# Patient Record
Sex: FEMALE | Race: WHITE | Employment: FULL TIME | URBAN - METROPOLITAN AREA
[De-identification: names, ages, dates, MRNs, and addresses within clinical notes are randomized per-mention and may not be internally consistent; named-entity substitution may affect disease eponyms.]

---

## 2017-01-31 ENCOUNTER — HOSPITAL ENCOUNTER (OUTPATIENT)
Dept: MRI IMAGING | Age: 47
Discharge: HOME | End: 2017-01-31
Attending: OBSTETRICS & GYNECOLOGY | Admitting: OBSTETRICS & GYNECOLOGY

## 2017-01-31 DIAGNOSIS — D25.1 INTRAMURAL LEIOMYOMA OF UTERUS: ICD-10-CM

## 2017-01-31 DIAGNOSIS — N83.202 BILATERAL OVARIAN CYSTS: ICD-10-CM

## 2017-01-31 DIAGNOSIS — N83.201 BILATERAL OVARIAN CYSTS: ICD-10-CM

## 2017-02-28 ENCOUNTER — HOSPITAL ENCOUNTER (OUTPATIENT)
Dept: MRI IMAGING | Age: 47
Discharge: HOME | End: 2017-02-28
Attending: OBSTETRICS & GYNECOLOGY | Admitting: OBSTETRICS & GYNECOLOGY

## 2017-02-28 DIAGNOSIS — D25.1 INTRAMURAL LEIOMYOMA OF UTERUS: ICD-10-CM

## 2022-03-14 ENCOUNTER — HOSPITAL ENCOUNTER (OUTPATIENT)
Age: 52
Setting detail: SPECIMEN
Discharge: HOME | End: 2022-03-14
Payer: COMMERCIAL

## 2022-03-14 ENCOUNTER — OFFICE VISIT (OUTPATIENT)
Dept: OBGYN | Age: 52
End: 2022-03-14
Payer: COMMERCIAL

## 2022-03-14 VITALS
BODY MASS INDEX: 30.55 KG/M2 | DIASTOLIC BLOOD PRESSURE: 97 MMHG | WEIGHT: 166 LBS | SYSTOLIC BLOOD PRESSURE: 155 MMHG | HEIGHT: 62 IN | HEART RATE: 94 BPM

## 2022-03-14 DIAGNOSIS — Z12.31 SCREENING MAMMOGRAM FOR BREAST CANCER: ICD-10-CM

## 2022-03-14 DIAGNOSIS — N74 FEMALE PELVIC INFLAMMATORY DISORDERS IN DISEASES CLASSIFIED ELSEWHERE: ICD-10-CM

## 2022-03-14 DIAGNOSIS — Z00.00 ROUTINE MEDICAL EXAM: ICD-10-CM

## 2022-03-14 DIAGNOSIS — N92.1 MENOMETRORRHAGIA: ICD-10-CM

## 2022-03-14 DIAGNOSIS — R03.0 ELEVATED BLOOD PRESSURE READING: ICD-10-CM

## 2022-03-14 DIAGNOSIS — Z01.419 ENCOUNTER FOR ANNUAL ROUTINE GYNECOLOGICAL EXAMINATION: Primary | ICD-10-CM

## 2022-03-14 LAB
A/G RATIO: 1.6 (ref 1.1–2.2)
ALBUMIN SERPL-MCNC: 4.6 G/DL (ref 3.4–5)
ALP BLD-CCNC: 80 U/L (ref 40–129)
ALT SERPL-CCNC: 14 U/L (ref 10–40)
ANION GAP SERPL CALCULATED.3IONS-SCNC: 17 MMOL/L (ref 3–16)
AST SERPL-CCNC: 16 U/L (ref 15–37)
BILIRUB SERPL-MCNC: 0.3 MG/DL (ref 0–1)
BUN BLDV-MCNC: 15 MG/DL (ref 7–20)
CALCIUM SERPL-MCNC: 9.8 MG/DL (ref 8.3–10.6)
CHLORIDE BLD-SCNC: 102 MMOL/L (ref 99–110)
CHOLESTEROL, TOTAL: 203 MG/DL (ref 0–199)
CO2: 22 MMOL/L (ref 21–32)
CREAT SERPL-MCNC: 0.7 MG/DL (ref 0.6–1.1)
FOLLICLE STIMULATING HORMONE: 40.4 MIU/ML
GFR AFRICAN AMERICAN: >60
GFR NON-AFRICAN AMERICAN: >60
GLUCOSE BLD-MCNC: 97 MG/DL (ref 70–99)
HCT VFR BLD CALC: 38.2 % (ref 36–48)
HDLC SERPL-MCNC: 52 MG/DL (ref 40–60)
HEMOGLOBIN: 12 G/DL (ref 12–16)
LDL CHOLESTEROL CALCULATED: 135 MG/DL
MCH RBC QN AUTO: 22.9 PG (ref 26–34)
MCHC RBC AUTO-ENTMCNC: 31.4 G/DL (ref 31–36)
MCV RBC AUTO: 73 FL (ref 80–100)
PDW BLD-RTO: 19.4 % (ref 12.4–15.4)
PLATELET # BLD: 281 K/UL (ref 135–450)
PMV BLD AUTO: 8.8 FL (ref 5–10.5)
POTASSIUM SERPL-SCNC: 4.3 MMOL/L (ref 3.5–5.1)
RBC # BLD: 5.23 M/UL (ref 4–5.2)
SODIUM BLD-SCNC: 141 MMOL/L (ref 136–145)
TOTAL PROTEIN: 7.5 G/DL (ref 6.4–8.2)
TRIGL SERPL-MCNC: 82 MG/DL (ref 0–150)
TSH REFLEX FT4: 1.67 UIU/ML (ref 0.27–4.2)
VLDLC SERPL CALC-MCNC: 16 MG/DL
WBC # BLD: 7.6 K/UL (ref 4–11)

## 2022-03-14 PROCEDURE — 88142 CYTOPATH C/V THIN LAYER: CPT

## 2022-03-14 PROCEDURE — 87624 HPV HI-RISK TYP POOLED RSLT: CPT

## 2022-03-14 PROCEDURE — 36415 COLL VENOUS BLD VENIPUNCTURE: CPT | Performed by: OBSTETRICS & GYNECOLOGY

## 2022-03-14 PROCEDURE — 99396 PREV VISIT EST AGE 40-64: CPT | Performed by: OBSTETRICS & GYNECOLOGY

## 2022-03-14 SDOH — ECONOMIC STABILITY: HOUSING INSECURITY
IN THE LAST 12 MONTHS, WAS THERE A TIME WHEN YOU DID NOT HAVE A STEADY PLACE TO SLEEP OR SLEPT IN A SHELTER (INCLUDING NOW)?: NO

## 2022-03-14 SDOH — ECONOMIC STABILITY: INCOME INSECURITY: HOW HARD IS IT FOR YOU TO PAY FOR THE VERY BASICS LIKE FOOD, HOUSING, MEDICAL CARE, AND HEATING?: NOT VERY HARD

## 2022-03-14 SDOH — ECONOMIC STABILITY: FOOD INSECURITY: WITHIN THE PAST 12 MONTHS, THE FOOD YOU BOUGHT JUST DIDN'T LAST AND YOU DIDN'T HAVE MONEY TO GET MORE.: NEVER TRUE

## 2022-03-14 SDOH — ECONOMIC STABILITY: INCOME INSECURITY: IN THE LAST 12 MONTHS, WAS THERE A TIME WHEN YOU WERE NOT ABLE TO PAY THE MORTGAGE OR RENT ON TIME?: NO

## 2022-03-14 SDOH — ECONOMIC STABILITY: FOOD INSECURITY: WITHIN THE PAST 12 MONTHS, YOU WORRIED THAT YOUR FOOD WOULD RUN OUT BEFORE YOU GOT MONEY TO BUY MORE.: NEVER TRUE

## 2022-03-14 SDOH — ECONOMIC STABILITY: TRANSPORTATION INSECURITY
IN THE PAST 12 MONTHS, HAS LACK OF TRANSPORTATION KEPT YOU FROM MEETINGS, WORK, OR FROM GETTING THINGS NEEDED FOR DAILY LIVING?: NO

## 2022-03-14 SDOH — ECONOMIC STABILITY: TRANSPORTATION INSECURITY
IN THE PAST 12 MONTHS, HAS THE LACK OF TRANSPORTATION KEPT YOU FROM MEDICAL APPOINTMENTS OR FROM GETTING MEDICATIONS?: NO

## 2022-03-14 ASSESSMENT — ENCOUNTER SYMPTOMS
ALLERGIC/IMMUNOLOGIC NEGATIVE: 1
GASTROINTESTINAL NEGATIVE: 1
EYES NEGATIVE: 1
RESPIRATORY NEGATIVE: 1

## 2022-03-14 ASSESSMENT — PATIENT HEALTH QUESTIONNAIRE - PHQ9
SUM OF ALL RESPONSES TO PHQ QUESTIONS 1-9: 0
SUM OF ALL RESPONSES TO PHQ QUESTIONS 1-9: 0
1. LITTLE INTEREST OR PLEASURE IN DOING THINGS: 0
SUM OF ALL RESPONSES TO PHQ QUESTIONS 1-9: 0
SUM OF ALL RESPONSES TO PHQ QUESTIONS 1-9: 0
SUM OF ALL RESPONSES TO PHQ9 QUESTIONS 1 & 2: 0
2. FEELING DOWN, DEPRESSED OR HOPELESS: 0

## 2022-03-14 NOTE — PROGRESS NOTES
3/14/22    [facility]  1970    Chief Complaint   Patient presents with    Gynecologic Exam     pt here for annual,is sexually active, irregular menses, LMP-3/5/22, last pap-8/17/2019-normal, mammo-2/25/21-normal.     Lower Back Pain     pt c/o lower back back pain x 6-8 months, dull pain, does not radiate.  Irregular Menses     pt c/o irregular menses x 2 months, menses lastin 2 1/2 wks, brightred, light bleeding. The patient is a 47 y.o. female, No obstetric history on file. who presents for her annual exam.  She is still menstruating. Her LMP was @LMP@. She is not currently taking birth control. She is  sexually active. She reports additional symptoms of abnormal bleeding and low back pain. Pap smear history: Her last PAP smear was in 2019. Her results were normal.    Breast history: her most recent mammogram was in 2019. The results were: Normal    Osteoporosis Status: she has not had a bone density scan    Colonoscopy Status: She has not had a colonoscopy in the past.    Past Medical History:   Diagnosis Date    Abnormal Pap smear of cervix     Fibroids     Hyperlipidemia     Hypertension     Irregular menses     Lower back pain     Ovarian cyst        Past Surgical History:   Procedure Laterality Date    TONSILLECTOMY      WISDOM TOOTH EXTRACTION         Family History   Problem Relation Age of Onset    Parkinson's Disease Maternal Grandmother     Prostate Cancer Paternal Grandmother        Social History     Tobacco Use    Smoking status: Never Smoker    Smokeless tobacco: Never Used   Vaping Use    Vaping Use: Never used   Substance Use Topics    Alcohol use: Yes     Comment: little    Drug use: Never       No current outpatient medications on file. No current facility-administered medications for this visit. No Known Allergies    No obstetric history on file. There is no immunization history on file for this patient.     Review of Systems Constitutional: Negative. Eyes: Negative. Respiratory: Negative. Cardiovascular: Negative. Gastrointestinal: Negative. Endocrine: Negative. Genitourinary: Positive for menstrual problem. Musculoskeletal: Negative. Skin: Negative. Allergic/Immunologic: Negative. Neurological: Negative. Hematological: Negative. Psychiatric/Behavioral: Negative. Repeat 155/97  BP (!) 155/97   Pulse 94   Ht 5' 2\" (1.575 m)   Wt 166 lb (75.3 kg)   LMP 03/05/2022   BMI 30.36 kg/m²     Physical Exam  Exam conducted with a chaperone present. Constitutional:       Appearance: Normal appearance. HENT:      Head: Normocephalic and atraumatic. Nose: Nose normal.   Eyes:      Conjunctiva/sclera: Conjunctivae normal.   Cardiovascular:      Rate and Rhythm: Normal rate. Pulses: Normal pulses. Pulmonary:      Effort: Pulmonary effort is normal.   Chest:   Breasts:      Right: Normal. No axillary adenopathy or supraclavicular adenopathy. Left: Normal. No axillary adenopathy or supraclavicular adenopathy. Abdominal:      General: Abdomen is flat. Bowel sounds are normal.      Palpations: Abdomen is soft. There is mass. Hernia: There is no hernia in the left inguinal area or right inguinal area. Comments: Uterus 26 week size with larger fibroid extending to luq   Genitourinary:     General: Normal vulva. Exam position: Lithotomy position. Labia:         Right: No rash, tenderness or lesion. Left: No rash, tenderness or lesion. Urethra: No prolapse. Vagina: Normal. No foreign body. No vaginal discharge, erythema, tenderness, bleeding, lesions or prolapsed vaginal walls. Cervix: No cervical motion tenderness, discharge, friability, lesion, erythema or cervical bleeding. Uterus: Normal. Enlarged. Not tender and no uterine prolapse.        Adnexa: Right adnexa normal and left adnexa normal.        Right: No mass, tenderness or fullness. Left: No mass, tenderness or fullness. Musculoskeletal:      Cervical back: Normal range of motion and neck supple. Lymphadenopathy:      Upper Body:      Right upper body: No supraclavicular, axillary or pectoral adenopathy. Left upper body: No supraclavicular, axillary or pectoral adenopathy. Skin:     General: Skin is warm. Coloration: Skin is not ashen or cyanotic. Findings: No abrasion, abscess or bruising. Rash is not crusting or macular. Neurological:      Mental Status: She is alert and oriented to person, place, and time. No results found for this visit on 03/14/22. Assessment and Plan   Diagnosis Orders   1. Encounter for annual routine gynecological examination  PAP SMEAR   2. Female pelvic inflammatory disorders in diseases classified elsewhere  PAP SMEAR   3. Screening mammogram for breast cancer  MARISSA DIGITAL SCREEN W OR WO CAD BILATERAL   4. Menometrorrhagia  CBC    Follicle Stimulating Hormone    TSH with Reflex to FT4    US NON OB TRANSVAGINAL   5. Routine medical exam  Kenn Acharya MD, Gastroenterology, [facility]   6. Elevated blood pressure reading  Lipid Panel    Comprehensive Metabolic Panel   follow up after us    Take BP at home, keep log, bring BP to next visit    Return in about 1 year (around 3/14/2023).     Martin Esparza MD

## 2022-03-15 ENCOUNTER — TELEPHONE (OUTPATIENT)
Dept: GASTROENTEROLOGY | Age: 52
End: 2022-03-15

## 2022-03-18 LAB
HPV HIGH RISK: NOT DETECTED
HPV, GENOTYPE 16: NOT DETECTED
HPV, GENOTYPE 18: NOT DETECTED

## 2022-03-22 ENCOUNTER — TELEPHONE (OUTPATIENT)
Dept: GASTROENTEROLOGY | Age: 52
End: 2022-03-22

## 2022-03-29 ENCOUNTER — TELEPHONE (OUTPATIENT)
Dept: GASTROENTEROLOGY | Age: 52
End: 2022-03-29

## 2022-03-29 ENCOUNTER — OFFICE VISIT (OUTPATIENT)
Dept: OBGYN | Age: 52
End: 2022-03-29
Payer: COMMERCIAL

## 2022-03-29 VITALS
DIASTOLIC BLOOD PRESSURE: 91 MMHG | SYSTOLIC BLOOD PRESSURE: 161 MMHG | WEIGHT: 166 LBS | HEIGHT: 62 IN | BODY MASS INDEX: 30.55 KG/M2

## 2022-03-29 DIAGNOSIS — Z12.31 SCREENING MAMMOGRAM FOR BREAST CANCER: ICD-10-CM

## 2022-03-29 DIAGNOSIS — D25.1 FIBROIDS, INTRAMURAL: ICD-10-CM

## 2022-03-29 DIAGNOSIS — N92.1 MENOMETRORRHAGIA: Primary | ICD-10-CM

## 2022-03-29 PROCEDURE — 99213 OFFICE O/P EST LOW 20 MIN: CPT | Performed by: OBSTETRICS & GYNECOLOGY

## 2022-03-29 NOTE — PROGRESS NOTES
Musculoskeletal:         General: Normal range of motion. Cervical back: Normal range of motion and neck supple. No rigidity. Skin:     General: Skin is warm and dry. Neurological:      General: No focal deficit present. Mental Status: She is alert and oriented to person, place, and time.    Psychiatric:         Mood and Affect: Mood normal.         Behavior: Behavior normal.         No results found for this visit on 03/29/22.  03/14/2022 1540 03/18/2022 0842 Human papillomavirus (HPV) DNA probe cervical brush high risk [[id number]]   Cervix    Component Value   HPV High Risk NOT DETECTED   HPV, Genotype 16 NOT DETECTED   HPV, Genotype 18 NOT DETECTED            03/14/2022 1539 03/14/2022 2055 Comprehensive Metabolic Panel [[id number]]   (Abnormal)   Blood    Component Value Units   Sodium 141 mmol/L   Potassium 4.3 mmol/L   Chloride 102 mmol/L   CO2 22 mmol/L   Anion Gap 17 High     Glucose 97 mg/dL   BUN 15 mg/dL   CREATININE 0.7 mg/dL   GFR Non-African American >60     GFR African American >60     Calcium 9.8 mg/dL   Total Protein 7.5 g/dL   Albumin 4.6 g/dL   Albumin/Globulin Ratio 1.6    Total Bilirubin 0.3 mg/dL   Alkaline Phosphatase 80 U/L   ALT 14 U/L   AST 16 U/L           03/14/2022 1539 03/14/2022 2055 Lipid Panel [[id number]]   (Abnormal)   Blood    Component Value Units   Cholesterol, Total 203 High  mg/dL   Triglycerides 82 mg/dL   HDL 52 mg/dL   LDL Calculated 135 High  mg/dL   VLDL Cholesterol Calculated 16 mg/dL           03/14/2022 1539 03/14/2022 2050 TSH with Reflex to FT4 [[id number]]   Blood    Component Value Units   TSH Reflex FT4 1.67 uIU/mL           03/14/2022 1539 24/53/1330 7079 Follicle Stimulating Hormone [[id number]]   Blood    Component Value Units   FSH 40.4  mIU/mL           03/14/2022 1539 03/14/2022 2033 CBC [[id number]]   (Abnormal)   Blood    Component Value Units   WBC 7.6 K/uL   RBC 5.23 High  M/uL   Hemoglobin 12.0 g/dL   Hematocrit 38.2 %   MCV 73.0 Low  fL MCH 22.9 Low  pg   MCHC 31.4 g/dL   RDW 19.4 High  %   Platelets 656 K/uL   MPV 8.8 fL             See us report (unable to visualize ems)    ASSESSMENT AND PLAN   Diagnosis Orders   1. Menometrorrhagia     2. Fibroids, intramural       Recommend endometrial sampling (hysterosocpy and D&C,possibly EMB) to rule out hyperplasia and neoplasia. She declines but states she will consider. Return if symptoms worsen or fail to improve.     Yancy Mcgee MD

## 2022-03-29 NOTE — TELEPHONE ENCOUNTER
Called pt. For the 3rd time in regards to a referral for a colon screening. Phone was picked up and then immediately disconnected. No contact made.

## 2022-07-21 ENCOUNTER — HOSPITAL ENCOUNTER (EMERGENCY)
Age: 52
Discharge: HOME | End: 2022-07-22
Attending: EMERGENCY MEDICINE
Payer: COMMERCIAL

## 2022-07-21 ENCOUNTER — APPOINTMENT (OUTPATIENT)
Dept: GENERAL RADIOLOGY | Age: 52
End: 2022-07-21
Payer: COMMERCIAL

## 2022-07-21 DIAGNOSIS — S53.105A CLOSED DISLOCATION OF LEFT ELBOW, INITIAL ENCOUNTER: Primary | ICD-10-CM

## 2022-07-21 PROCEDURE — 73080 X-RAY EXAM OF ELBOW: CPT

## 2022-07-21 PROCEDURE — 24605 TX CLSD ELBOW DISLC REQ ANES: CPT

## 2022-07-21 PROCEDURE — 99285 EMERGENCY DEPT VISIT HI MDM: CPT

## 2022-07-21 PROCEDURE — 24600 TX CLSD ELBOW DISLC W/O ANES: CPT

## 2022-07-21 RX ORDER — ETOMIDATE 2 MG/ML
7 INJECTION INTRAVENOUS ONCE
Status: COMPLETED | OUTPATIENT
Start: 2022-07-21 | End: 2022-07-22

## 2022-07-21 ASSESSMENT — PAIN SCALES - GENERAL: PAINLEVEL_OUTOF10: 6

## 2022-07-21 ASSESSMENT — PAIN - FUNCTIONAL ASSESSMENT: PAIN_FUNCTIONAL_ASSESSMENT: NONE - DENIES PAIN

## 2022-07-21 ASSESSMENT — PAIN DESCRIPTION - FREQUENCY: FREQUENCY: CONTINUOUS

## 2022-07-21 ASSESSMENT — PAIN DESCRIPTION - PAIN TYPE: TYPE: ACUTE PAIN

## 2022-07-21 ASSESSMENT — PAIN DESCRIPTION - LOCATION: LOCATION: ELBOW

## 2022-07-21 ASSESSMENT — PAIN DESCRIPTION - ORIENTATION: ORIENTATION: LEFT

## 2022-07-22 ENCOUNTER — APPOINTMENT (OUTPATIENT)
Dept: GENERAL RADIOLOGY | Age: 52
End: 2022-07-22
Payer: COMMERCIAL

## 2022-07-22 VITALS
OXYGEN SATURATION: 100 % | SYSTOLIC BLOOD PRESSURE: 144 MMHG | HEIGHT: 63 IN | DIASTOLIC BLOOD PRESSURE: 89 MMHG | WEIGHT: 150 LBS | TEMPERATURE: 98.2 F | BODY MASS INDEX: 26.58 KG/M2 | HEART RATE: 79 BPM | RESPIRATION RATE: 16 BRPM

## 2022-07-22 PROCEDURE — 2500000003 HC RX 250 WO HCPCS: Performed by: EMERGENCY MEDICINE

## 2022-07-22 PROCEDURE — 73080 X-RAY EXAM OF ELBOW: CPT

## 2022-07-22 RX ADMIN — ETOMIDATE 7 MG: 2 INJECTION INTRAVENOUS at 00:06

## 2022-07-22 ASSESSMENT — PAIN - FUNCTIONAL ASSESSMENT: PAIN_FUNCTIONAL_ASSESSMENT: 0-10

## 2022-07-22 ASSESSMENT — PAIN DESCRIPTION - LOCATION
LOCATION: ELBOW
LOCATION: ELBOW

## 2022-07-22 ASSESSMENT — PAIN SCALES - GENERAL
PAINLEVEL_OUTOF10: 4
PAINLEVEL_OUTOF10: 7

## 2022-07-22 ASSESSMENT — PAIN DESCRIPTION - ORIENTATION
ORIENTATION: LEFT
ORIENTATION: LEFT

## 2022-07-22 NOTE — ED PROVIDER NOTES
Transportation (Medical): No    Lack of Transportation (Non-Medical): No   Physical Activity: Not on file   Stress: Not on file   Social Connections: Not on file   Intimate Partner Violence: Not on file   Housing Stability: Unknown    Unable to Pay for Housing in the Last Year: No    Number of Places Lived in the Last Year: Not on file    Unstable Housing in the Last Year: No     No current facility-administered medications for this encounter. No current outpatient medications on file. No Known Allergies    Nursing Notes Reviewed    Physical Exam:  ED Triage Vitals [07/21/22 2123]   Enc Vitals Group      BP (!) 153/102      Heart Rate 85      Resp 15      Temp 98.2 °F (36.8 °C)      Temp Source Oral      SpO2 96 %      Weight 150 lb (68 kg)      Height 5' 3\" (1.6 m)      Head Circumference       Peak Flow       Pain Score       Pain Loc       Pain Edu? Excl. in [address]? GENERAL APPEARANCE: Awake and alert. Cooperative. No acute distress. HEAD: Normocephalic. Atraumatic. EYES: EOM's grossly intact. Sclera anicteric. ENT: Mucous membranes are moist. Tolerates saliva. No trismus. NECK: No meningismus. HEART:  Extremities pink  LUNGS: Respirations unlabored. Even chest rise bilaterally  ABDOMEN: Non distended. EXTREMITIES: LUE: Elbow deformity with limited range of motion and swelling. 2+ radial pulse. Sensory distribution of radial/median/ulnar nerves intact. 5/5 strength on wrist flexion and extension. SKIN: Dry  NEUROLOGICAL: No gross facial drooping. Moves all 4 extremities spontaneously. PSYCHIATRIC: Normal mood. I have reviewed and interpreted all of the currently available lab results from this visit (if applicable):  No results found for this visit on 07/21/22.    Radiographs (if obtained):  [] The following radiograph was interpreted by myself in the absence of a radiologist:  [x] Radiologist's Report Reviewed:    EKG (if obtained): (All EKG's are interpreted by myself in the absence of a cardiologist)    MDM:  Plan of care is discussed thoroughly with the patient and family if present. If performed, all imaging and lab work also discussed with patient. All relevant prior results and chart reviewed if available. Patient presents as above. She is neurovascular intact. Presents with isolated left elbow injury and does have elbow dislocation. Patient was reduced under sedation without complication. Repeat films do not show any evidence of acute fracture. Neurovascular status unchanged on reevaluation. She is placed in a sling and will follow up with orthopedic surgery. Patient agrees with this plan of care. Procedure Note - Joint Reduction: The benefits, risks, and alternatives of elbow reduction were discussed with the patient. Questions were sought and answered. Written consent was given for the procedure. Prior to joint reduction a time out with nursing was called. Jesus Amato was given 7mg of etomidate via IV and procedural pain control was adequately achieved. The dislocation and/or fracture was reduced to the best of my abilities utilizing traction. Following reduction, immobilization was performed and the extremity's neurovascular status was re-checked and was unchanged from the pre-procedure exam. The patient tolerated the procedure without complications. Instructions were given to return immediately for increasing pain, new numbness or weakness, a cold/pale extremity or any other worsening or worrisome concerns. Procedure Note - Procedural Sedation: The benefits, risks, and alternatives of procedural sedation were discussed with the patient. Questions were sought and answered. Written consent was obtained for the procedure. Oxygen was administered and the appropriate pre-procedural policies were followed. Cardiac, oxygenation and blood pressure monitoring occurred.     Airway Assessment: dentition not prohibitive, normal neck range of motion, Mallampati Class II - (soft palate, fauces & uvula are visible)    ASA Classification: Class 2 - A normal healthy patient with mild systemic disease    Prior to sedation a time out with nursing was called. Edward Brush was given a total of 7mg of etomidate and adequate procedural sedation was achieved. The patient tolerated the procedure without complications. Edward Brush regained consciousness as expected and has recovered to their baseline mental status. 20 minutes of intra-service time was provided. Clinical Impression:  1.  Closed dislocation of left elbow, initial encounter      (Please note that portions of this note may have been completed with a voice recognition program. Efforts were made to edit the dictations but occasionally words are mis-transcribed.)    MD Miladis Biggs MD  07/22/22 3181

## 2022-07-22 NOTE — ED NOTES
Discharge instructions reviewed with patient. The patient voiced understanding of the discharge paperwork and received no prescriptions. The patient left alert and oriented with no questions or concerns.       Rea Vivar RN  07/22/22 9921

## 2022-07-22 NOTE — ED NOTES
The patient presents to the emergency department alert and oriented with a complaint of left elbow pain after a fall in the bathroom. The patient denies any other injuries. The patient placed on the monitor with vital signs taken. Assessment as follows; Skin is pink, warm and dry. He left elbow is deformed.        Jacinto Schuster RN  07/21/22 5410

## 2022-07-22 NOTE — ED NOTES
Etomidate 13 mg was wasted by placing in medication disposal container after speaking with the pharmacist who states that there was no waist paper due to the way the medication was ordered. This waist was witnessed by [employer].      Carli Trent RN  07/22/22 9874

## 2022-07-29 ENCOUNTER — OFFICE VISIT (OUTPATIENT)
Dept: ORTHOPEDIC SURGERY | Age: 52
End: 2022-07-29
Payer: COMMERCIAL

## 2022-07-29 VITALS
WEIGHT: 168 LBS | HEART RATE: 86 BPM | DIASTOLIC BLOOD PRESSURE: 84 MMHG | HEIGHT: 63 IN | BODY MASS INDEX: 29.77 KG/M2 | SYSTOLIC BLOOD PRESSURE: 148 MMHG | OXYGEN SATURATION: 97 %

## 2022-07-29 DIAGNOSIS — M25.322 INSTABILITY OF LEFT ELBOW JOINT: Primary | ICD-10-CM

## 2022-07-29 PROCEDURE — 99203 OFFICE O/P NEW LOW 30 MIN: CPT | Performed by: STUDENT IN AN ORGANIZED HEALTH CARE EDUCATION/TRAINING PROGRAM

## 2022-07-29 ASSESSMENT — ENCOUNTER SYMPTOMS
VOMITING: 0
WHEEZING: 0
BACK PAIN: 0
COLOR CHANGE: 0
ABDOMINAL PAIN: 0
FACIAL SWELLING: 0
NAUSEA: 0
EYE REDNESS: 0
COUGH: 0
SHORTNESS OF BREATH: 0
EYE PAIN: 0
STRIDOR: 0
PHOTOPHOBIA: 0

## 2022-07-29 NOTE — PROGRESS NOTES
7/29/2022   Chief Complaint   Patient presents with    Elbow Injury     Reporting pain on elbow/triceps after having a fall at home. History of Present Illness:                             Les Munoz is a 47 y.o. female right hand-dominant male referred by emergency room for evaluation and treatment of left elbow dislocation. Patient was seen in the [facility] emergency room approximate 1 week ago when she slipped and fell from standing height in her home landing on her outstretched left arm. She suffered a elbow dislocation at that time and had immediate deformity and pain and was unable to move the elbow due to mechanical block. She was brought to the emergency room where x-rays demonstrated a simple left elbow dislocation. She was subsequently sedated and the elbow was reduced without complication. Patient had no complications from the elbow dislocation or reduction procedure. She was placed in a simple sling and told to follow-up with orthopedics. She is here today for first visit with myself. She states she is coming to the sling as her elbow has regained full range of motion. She states she has essentially no pain at this time and no additional symptoms such as numbness or tingling. She states she can fully extend and flex the elbow without issue. She denies any prior elbow injury including dislocation. No additional interventions in regards to the left elbow. Related history: negative for prior surgery, additional trauma, arthritis or disorders. Is affecting ADLs. Pain is 1/10 at it's worst.    Outside reports reviewed: Emergency room note and imaging reviewed    Patient's medications, allergies, past medical, surgical, social and family histories were reviewed and updated as appropriate.      Patient has not previously attempted CSI, PT, NSAIDs for pain relief     MA HPI: Patient, 62 yo female, presents to the office today for a followup appointment following an ED visit on 7/21/2022. Two sets of x-rays were taken. Patient reports pain on point of elbow and around triceps. ROM improving. Right hand dominant. No report of numbness or tingling. Medical History  Patient's medications, allergies, past medical, surgical, social and family histories were reviewed and updated as appropriate. Past Medical History:   Diagnosis Date    Abnormal Pap smear of cervix     Fibroids     Hyperlipidemia     Hypertension     Irregular menses     Lower back pain     Ovarian cyst      Past Surgical History:   Procedure Laterality Date    TONSILLECTOMY      WISDOM TOOTH EXTRACTION       Family History   Problem Relation Age of Onset    Parkinson's Disease Maternal Grandmother     Prostate Cancer Paternal Grandmother      Social History     Socioeconomic History    Marital status:    Tobacco Use    Smoking status: Never    Smokeless tobacco: Never   Vaping Use    Vaping Use: Never used   Substance and Sexual Activity    Alcohol use: Yes     Comment: little    Drug use: Never     Social Determinants of Health     Financial Resource Strain: Low Risk     Difficulty of Paying Living Expenses: Not very hard   Food Insecurity: No Food Insecurity    Worried About Running Out of Food in the Last Year: Never true    Ran Out of Food in the Last Year: Never true   Transportation Needs: No Transportation Needs    Lack of Transportation (Medical): No    Lack of Transportation (Non-Medical): No   Housing Stability: Unknown    Unable to Pay for Housing in the Last Year: No    Unstable Housing in the Last Year: No     No current outpatient medications on file. No current facility-administered medications for this visit. No Known Allergies      Review of Systems   Constitutional:  Positive for activity change. Negative for appetite change, chills, diaphoresis, fatigue, fever and unexpected weight change. HENT:  Negative for congestion, ear pain, facial swelling, hearing loss and sneezing.     Eyes: Negative for photophobia, pain and redness. Respiratory:  Negative for cough, shortness of breath, wheezing and stridor. Cardiovascular:  Negative for chest pain, palpitations and leg swelling. Gastrointestinal:  Negative for abdominal pain, nausea and vomiting. Endocrine: Negative for cold intolerance and heat intolerance. Musculoskeletal:  Positive for joint swelling. Negative for arthralgias, back pain, gait problem, myalgias, neck pain and neck stiffness. Skin:  Negative for color change, pallor, rash and wound. Neurological:  Negative for dizziness, facial asymmetry, weakness and numbness. Examination:  General Exam:  Vitals: BP (!) 148/84 (Site: Right Upper Arm, Position: Sitting)   Pulse 86   Ht 5' 3\" (1.6 m)   Wt 168 lb (76.2 kg)   SpO2 97%   BMI 29.76 kg/m²    Physical Exam  Constitutional:       General: She is not in acute distress. Appearance: Normal appearance. She is normal weight. She is not ill-appearing. HENT:      Head: Normocephalic and atraumatic. Eyes:      General:         Right eye: No discharge. Left eye: No discharge. Extraocular Movements: Extraocular movements intact. Cardiovascular:      Rate and Rhythm: Normal rate. Pulses: Normal pulses. Pulmonary:      Effort: Pulmonary effort is normal.      Breath sounds: Normal breath sounds. Musculoskeletal:         General: Swelling and signs of injury present. No tenderness or deformity. Right shoulder: Normal.      Left shoulder: Normal.      Right upper arm: Normal.      Left upper arm: Normal.      Right elbow: Normal.      Left elbow: Swelling present. No deformity, effusion or lacerations. Decreased range of motion. Right forearm: Normal.      Left forearm: Normal.      Right wrist: Normal.      Left wrist: Normal.      Cervical back: Normal range of motion. Skin:     General: Skin is warm and dry.       Capillary Refill: Capillary refill takes less than 2 seconds. Neurological:      General: No focal deficit present. Mental Status: She is alert and oriented to person, place, and time. Mental status is at baseline. Psychiatric:         Mood and Affect: Mood normal.         Behavior: Behavior normal.         LEFT UPPER EXTREMITY EXAM:      OBSERVATION / INSPECTION:     Swelling: Mild at elbow joint    Deformity: none    Discoloration: none     Scars: none     Atrophy: none          TENDERNESS / CREPITUS (T / C): Ulnar Styloid -/-    Radial Styloid -/-    Palm -/-    Metacarpals -/-    Thumb CMC -/-   Thumb MCP -/-    Lesser MCPs -/-    PIP -/-    DIP -/-    Radial Head - / -    Olecranon -/ -    Medial Epicondyle - /-   Lat Epicondyle - / -    Extensor Mass - / -   Flexor Mass - / -    Biceps tendon insertion - / -        Range of motion: ('*' = with pain)     Right Elbow     AROM (PROM)     Extension 0 deg  (5 deg)     Flexion 145 deg (145 deg)        Pronation 90 deg  (90 deg)     Supination 80 deg  (80 deg)                Left Elbow     AROM (PROM)     Extension 5 deg  (0 deg)     Flexion 145 deg (145 deg)        Pronation 90 deg  (90 deg)     Supination 80 deg  (80 deg)          Right Wrist    Extension 80 deg (85 deg)     Flexion 80 deg (85 deg)        Ulnar Deviation  35 deg (40 deg)    Radial Deviation 35 deg (40 deg)             Left Wrist     AROM (PROM)     Extension 80 deg (85 deg)     Flexion 80 deg (85 deg)        Ulnar Deviation  35 deg (40 deg)    Radial Deviation 35 deg (40 deg)         ELBOW EXAMINATION:    Minimal pain with supination pronation    Stable with varus/valgus stress    Moving valgus stress test: Negative    Milking maneuver: Negative    No sensation of instability, subluxation, dislocation or mechanical symptoms with elbow flexion extension including to full extension.            STRENGTH: ('*' = with pain)     Elbow flexion extension not tested this time due to concern for instability of elbow    Wrist Flexion: 5/5    Wrist Extension: 5/5    : 5/5    Intrinsics: 5/5    EPL (Extensor pollicis longus): 5/5    Pinch Mechanism: 5/5      EXTREMITY NEURO-VASCULAR EXAMINATION: Sensation grossly intact to light touch all dermatomal regions. DTR 2+ Biceps, Triceps, BR and Negative Lindy's sign. Grossly intact motor function at Elbow, Wrist and Hand. Distal pulses radial and ulnar 2+, brisk cap refill, symmetric. Diagnostic testing:  X-ray images were reviewed by myself and discussed with the patient:  3 view prereduction of left elbow demonstrate: There is a left elbow dislocation. The distal humerus is dislocated   anteriorly in relation to the olecranon fossa and radial head. No evidence   of an acute fracture. There are a few small smooth ossific densities. Postreduction study recommended. 3 view postreduction of left elbow demonstrate:   Reduction of the previously dislocated left elbow. The alignment is now   acceptable. Bulging fat pads are present from joint effusion/heme arthrosis. No acute fracture is seen around the margins of the left elbow. There are   corticated ossifications at the medial and lateral aspects. Mild swelling around the elbow. Office Procedures:  No orders of the defined types were placed in this encounter. Assessment and Plan    A: Left elbow instability    P:   I had a thorough conversation with the patient regarding her left elbow imaging and treatment course. Because the patient has no history of elbow instability I am hoping this is a isolated event. I explained that she is doing excellent on physical exam, much better than most people do she essentially demonstrates full range of motion with minimal symptoms. However, I do not want to be overly confident in the stability of her elbow at this time we will place her in a hinged elbow brace blocking her from extension beyond 15 degrees as well as flexion beyond 90 degrees.   We will continue this for 4 weeks and then wean her out of the brace and begin her in physical therapy. She can use over-the-counter anti-inflammatories and ice for pain control. She should essentially be in the brace at all times unless doing hygiene. All questions were answered and patient voices understanding.     Electronically signed by Jose Yeung DO on 7/29/2022 at 8:21 AM

## 2022-07-29 NOTE — PATIENT INSTRUCTIONS
Continue weight-bearing as tolerated. Continue range of motion exercises as instructed. Ice and elevate as needed. Tylenol or Motrin for pain.    Wear brace as instructed  Follow up in 4 weeks

## 2022-07-29 NOTE — PROGRESS NOTES
Patient, 62 yo female, presents to the office today for a followup appointment following an ED visit on 7/21/2022. Two sets of x-rays were taken. Patient reports pain on point of elbow and around triceps. ROM improving. Right hand dominant. No report of numbness or tingling.

## 2022-08-26 ENCOUNTER — OFFICE VISIT (OUTPATIENT)
Dept: ORTHOPEDIC SURGERY | Age: 52
End: 2022-08-26
Payer: COMMERCIAL

## 2022-08-26 VITALS
WEIGHT: 168 LBS | OXYGEN SATURATION: 97 % | HEART RATE: 77 BPM | SYSTOLIC BLOOD PRESSURE: 140 MMHG | BODY MASS INDEX: 29.77 KG/M2 | DIASTOLIC BLOOD PRESSURE: 82 MMHG | RESPIRATION RATE: 16 BRPM | HEIGHT: 63 IN

## 2022-08-26 DIAGNOSIS — M25.322 INSTABILITY OF LEFT ELBOW JOINT: Primary | ICD-10-CM

## 2022-08-26 PROCEDURE — 99213 OFFICE O/P EST LOW 20 MIN: CPT | Performed by: STUDENT IN AN ORGANIZED HEALTH CARE EDUCATION/TRAINING PROGRAM

## 2022-08-26 ASSESSMENT — ENCOUNTER SYMPTOMS
PHOTOPHOBIA: 0
STRIDOR: 0
ABDOMINAL PAIN: 0
FACIAL SWELLING: 0
COLOR CHANGE: 0
VOMITING: 0
WHEEZING: 0
BACK PAIN: 0
NAUSEA: 0
EYE REDNESS: 0
EYE PAIN: 0
SHORTNESS OF BREATH: 0
COUGH: 0

## 2022-08-26 NOTE — PROGRESS NOTES
8/26/2022   Chief Complaint   Patient presents with    Follow-up     Left elbow instability     Updated HPI: Patient is here for reevaluation of her left elbow dislocation. She states has been doing well in the brace and has had no issues with sensation of subluxation or dislocation. She states that the elbow feels stable especially in the brace and her pain has been well controlled. She has been following her restrictions while at work and at home without issue. No new injuries or any new orthopedic complaints. Previous HPI (7/29/2022):                             Felicity Covington is a 47 y.o. female right hand-dominant male referred by emergency room for evaluation and treatment of left elbow dislocation. Patient was seen in the [facility] emergency room approximate 1 week ago when she slipped and fell from standing height in her home landing on her outstretched left arm. She suffered a elbow dislocation at that time and had immediate deformity and pain and was unable to move the elbow due to mechanical block. She was brought to the emergency room where x-rays demonstrated a simple left elbow dislocation. She was subsequently sedated and the elbow was reduced without complication. Patient had no complications from the elbow dislocation or reduction procedure. She was placed in a simple sling and told to follow-up with orthopedics. She is here today for first visit with myself. She states she is coming to the sling as her elbow has regained full range of motion. She states she has essentially no pain at this time and no additional symptoms such as numbness or tingling. She states she can fully extend and flex the elbow without issue. She denies any prior elbow injury including dislocation. No additional interventions in regards to the left elbow. Related history: negative for prior surgery, additional trauma, arthritis or disorders. Is affecting ADLs.   Pain is 1/10 at it's worst.    Outside reports reviewed: Emergency room note and imaging reviewed    Patient's medications, allergies, past medical, surgical, social and family histories were reviewed and updated as appropriate. Patient has not previously attempted CSI, PT, NSAIDs for pain relief     MA HPI: Patient, 64 yo female, presents to the office today for a followup appointment following an ED visit on 7/21/2022. Two sets of x-rays were taken. Patient reports pain on point of elbow and around triceps. ROM improving. Right hand dominant. No report of numbness or tingling. Medical History  Patient's medications, allergies, past medical, surgical, social and family histories were reviewed and updated as appropriate.     Past Medical History:   Diagnosis Date    Abnormal Pap smear of cervix     Fibroids     Hyperlipidemia     Hypertension     Irregular menses     Lower back pain     Ovarian cyst      Past Surgical History:   Procedure Laterality Date    TONSILLECTOMY      WISDOM TOOTH EXTRACTION       Family History   Problem Relation Age of Onset    Parkinson's Disease Maternal Grandmother     Prostate Cancer Paternal Grandmother      Social History     Socioeconomic History    Marital status:    Tobacco Use    Smoking status: Never    Smokeless tobacco: Never   Vaping Use    Vaping Use: Never used   Substance and Sexual Activity    Alcohol use: Yes     Comment: little    Drug use: Never     Social Determinants of Health     Financial Resource Strain: Low Risk     Difficulty of Paying Living Expenses: Not very hard   Food Insecurity: No Food Insecurity    Worried About Running Out of Food in the Last Year: Never true    Ran Out of Food in the Last Year: Never true   Transportation Needs: No Transportation Needs    Lack of Transportation (Medical): No    Lack of Transportation (Non-Medical): No   Housing Stability: Unknown    Unable to Pay for Housing in the Last Year: No    Unstable Housing in the Last Year: No     No current outpatient medications on file. No current facility-administered medications for this visit. No Known Allergies      Review of Systems   Constitutional:  Positive for activity change. Negative for appetite change, chills, diaphoresis, fatigue, fever and unexpected weight change. HENT:  Negative for congestion, ear pain, facial swelling, hearing loss and sneezing. Eyes:  Negative for photophobia, pain and redness. Respiratory:  Negative for cough, shortness of breath, wheezing and stridor. Cardiovascular:  Negative for chest pain, palpitations and leg swelling. Gastrointestinal:  Negative for abdominal pain, nausea and vomiting. Endocrine: Negative for cold intolerance and heat intolerance. Musculoskeletal:  Positive for joint swelling. Negative for arthralgias, back pain, gait problem, myalgias, neck pain and neck stiffness. Skin:  Negative for color change, pallor, rash and wound. Neurological:  Negative for dizziness, facial asymmetry, weakness and numbness. Examination:  General Exam:  Vitals: BP (!) 140/82 (Site: Right Upper Arm, Position: Sitting)   Pulse 77   Resp 16   Ht 5' 3\" (1.6 m)   Wt 168 lb (76.2 kg)   SpO2 97%   BMI 29.76 kg/m²    Physical Exam  Constitutional:       General: She is not in acute distress. Appearance: Normal appearance. She is normal weight. She is not ill-appearing. HENT:      Head: Normocephalic and atraumatic. Eyes:      General:         Right eye: No discharge. Left eye: No discharge. Extraocular Movements: Extraocular movements intact. Cardiovascular:      Rate and Rhythm: Normal rate. Pulses: Normal pulses. Pulmonary:      Effort: Pulmonary effort is normal.      Breath sounds: Normal breath sounds. Musculoskeletal:         General: Swelling and signs of injury present. No tenderness or deformity.       Right shoulder: Normal.      Left shoulder: Normal. Right upper arm: Normal.      Left upper arm: Normal.      Right elbow: Normal.      Left elbow: Swelling present. No deformity, effusion or lacerations. Decreased range of motion. Right forearm: Normal.      Left forearm: Normal.      Right wrist: Normal.      Left wrist: Normal.      Cervical back: Normal range of motion. Skin:     General: Skin is warm and dry. Capillary Refill: Capillary refill takes less than 2 seconds. Neurological:      General: No focal deficit present. Mental Status: She is alert and oriented to person, place, and time. Mental status is at baseline. Psychiatric:         Mood and Affect: Mood normal.         Behavior: Behavior normal.         LEFT UPPER EXTREMITY EXAM:      OBSERVATION / INSPECTION:     Swelling: Mild at elbow joint    Deformity: none    Discoloration: none     Scars: none     Atrophy: none          TENDERNESS / CREPITUS (T / C):       Ulnar Styloid -/-    Radial Styloid -/-    Palm -/-    Metacarpals -/-    Thumb CMC -/-   Thumb MCP -/-    Lesser MCPs -/-    PIP -/-    DIP -/-    Radial Head - / -    Olecranon -/ -    Medial Epicondyle - /-   Lat Epicondyle - / -    Extensor Mass - / -   Flexor Mass - / -    Biceps tendon insertion - / -        Range of motion: ('*' = with pain)     Right Elbow     AROM (PROM)     Extension 0 deg  (5 deg)     Flexion 145 deg (145 deg)        Pronation 90 deg  (90 deg)     Supination 80 deg  (80 deg)                Left Elbow     AROM (PROM)     Extension 5 deg  (0 deg)     Flexion 145 deg (145 deg)        Pronation 90 deg  (90 deg)     Supination 80 deg  (80 deg)          Right Wrist    Extension 80 deg (85 deg)     Flexion 80 deg (85 deg)        Ulnar Deviation  35 deg (40 deg)    Radial Deviation 35 deg (40 deg)             Left Wrist     AROM (PROM)     Extension 80 deg (85 deg)     Flexion 80 deg (85 deg)        Ulnar Deviation  35 deg (40 deg)    Radial Deviation 35 deg (40 deg)         ELBOW EXAMINATION:    Minimal pain with supination pronation    Stable with varus/valgus stress    Moving valgus stress test: Negative    Milking maneuver: Negative    No sensation of instability, subluxation, dislocation or mechanical symptoms with elbow flexion extension including to full extension. STRENGTH: ('*' = with pain)     Elbow flexion extension not tested this time due to concern for instability of elbow    Wrist Flexion: 5/5    Wrist Extension: 5/5    : 5/5    Intrinsics: 5/5    EPL (Extensor pollicis longus): 5/5    Pinch Mechanism: 5/5      EXTREMITY NEURO-VASCULAR EXAMINATION: Sensation grossly intact to light touch all dermatomal regions. DTR 2+ Biceps, Triceps, BR and Negative Lindy's sign. Grossly intact motor function at Elbow, Wrist and Hand. Distal pulses radial and ulnar 2+, brisk cap refill, symmetric. Diagnostic testing:  X-ray images were reviewed by myself and discussed with the patient:  3 view prereduction of left elbow demonstrate: There is a left elbow dislocation. The distal humerus is dislocated   anteriorly in relation to the olecranon fossa and radial head. No evidence   of an acute fracture. There are a few small smooth ossific densities. Postreduction study recommended. 3 view postreduction of left elbow demonstrate:   Reduction of the previously dislocated left elbow. The alignment is now   acceptable. Bulging fat pads are present from joint effusion/heme arthrosis. No acute fracture is seen around the margins of the left elbow. There are   corticated ossifications at the medial and lateral aspects. Mild swelling around the elbow. Office Procedures:  No orders of the defined types were placed in this encounter. Assessment and Plan    A: Left elbow instability    P:   I had a thorough conversation the patient regarding her previous imaging, her current physical exam findings, and her treatment course.   At this time we will continue conservative measures. She will be in the brace at all times unless doing hygiene or restful activities at home or with physical therapy. She was given a referral to physical therapy today she will follow-up in 4 weeks for reevaluation and we will likely wean her out of the splint at that time if she continues to do well. She is had no subluxation or dislocation episodes since last being seen her pain is well controlled and therefore I feel that she is doing well conservative treatment we will continue her current restrictions. Continue ice and anti-inflammatories for pain control. All questions answered patient voiced understanding.     Electronically signed by Tanisha Salmeron DO on 8/26/2022 at 8:37 AM

## 2022-08-26 NOTE — PATIENT INSTRUCTIONS
Physical therapy ordered. May come out of the brace at home and for exercises and hygiene. Follow up in 4 weeks.

## 2022-09-22 ENCOUNTER — HOSPITAL ENCOUNTER (OUTPATIENT)
Dept: PHYSICAL THERAPY | Age: 52
Setting detail: THERAPIES SERIES
Discharge: HOME | End: 2022-09-22
Payer: COMMERCIAL

## 2022-09-22 PROCEDURE — 97110 THERAPEUTIC EXERCISES: CPT

## 2022-09-22 PROCEDURE — 97161 PT EVAL LOW COMPLEX 20 MIN: CPT

## 2022-09-22 ASSESSMENT — PAIN DESCRIPTION - FREQUENCY: FREQUENCY: INTERMITTENT

## 2022-09-22 ASSESSMENT — PAIN DESCRIPTION - LOCATION: LOCATION: ELBOW

## 2022-09-22 ASSESSMENT — PAIN SCALES - GENERAL: PAINLEVEL_OUTOF10: 0

## 2022-09-22 ASSESSMENT — PAIN - FUNCTIONAL ASSESSMENT: PAIN_FUNCTIONAL_ASSESSMENT: ACTIVITIES ARE NOT PREVENTED

## 2022-09-22 ASSESSMENT — PAIN DESCRIPTION - DESCRIPTORS: DESCRIPTORS: DULL

## 2022-09-22 ASSESSMENT — PAIN DESCRIPTION - ORIENTATION: ORIENTATION: LEFT;POSTERIOR

## 2022-09-22 ASSESSMENT — PAIN DESCRIPTION - PAIN TYPE: TYPE: CHRONIC PAIN

## 2022-09-22 NOTE — FLOWSHEET NOTE
Outpatient Physical Therapy  [facility]           [x] Phone: [phone]   Fax: [phone]  [facility]           [] Phone: [phone]   Fax: [phone]        Physical Therapy Daily Treatment Note  Date:  2022    Patient Name:  [facility]    :  1970  MRN: [id number]  Restrictions/Precautions: No data recorded      Diagnosis:   Other instability, left elbow [M25.322]    Date of Injury/Surgery:   Treatment Diagnosis:  L elbow pain, reduced strength  Insurance/Certification information:  [facility]      Referring Physician:  Laura Koroma DO     PCP: No primary care provider on file. Next Doctor Visit:    Plan of care signed (Y/N):  N, sent 22  Outcome Measure: Quick DASH: 23% disability   Visit# / total visits:  1 /4 per POC  Pain level: 0/10   Goals:     Patient goals: return to full function. Short term goals  Time Frame for Short term goals: Defer to [place] Term Goals  Time Frame for Long Term Goals: 4 weeks 10/22/22  Pt will demo I with HEP/symptom management. Pt will demo full elbow AROM without any increase in elbow pain/symptoms to ease ADLs. Pt will demo 5/5 strength and able to comfortably weightbear through shoulder without symptoms to ease lifting. Pt will demo <12% disability per Quick DASH to demo improved function. Pt will demo lifting 5# overhead with L UE x5 to demo improved tolerance with L UE. Summary of Evaluation:   Pt is 47year old female with L elbow dislocation mid 2022. Pt now has difficulties completing lifting with L UE and reaching with L UE with ADLs. Pt demo deficits this date that include L UE pain, difficulties with elbow ext and elbow/shoulder weakness. Pt will benefit with PT services with progression of strength/ROM and modalities to return to PLOF. Pt prior to onset of current condition had min/no pain with able to complete full ADLs and work activities.  Patient received education on their current pathology and how their condition effects them with their functional activities. Patient understood discussion and questions were answered. Patient understands their activity limitations and understands rational for treatment progression. Subjective:  See hoang         Any changes in Ambulatory Summary Sheet? None        Objective:  See eval   COVID screening questions were asked and patient attested that there had been no contact or symptoms        Exercises: (No more than 4 columns)   Exercise/Equipment 9/22/22  #1 Date Date           WARM UP       UBE              TABLE      *Taffy pull  L UE GTB 10x2                                STANDING      *Resisted scaption  GTB 10x2     *Elbow ext  15\"x4     *UE weightshifting  Arm lifts 10x2                                  PROPRIOCEPTION                                    MODALITIES                      Other Therapeutic Activities/Education:  Patient received education on their current pathology and how their condition effects them with their functional activities. Patient understood discussion and questions were answered. Patient understands their activity limitations and understands rational for treatment progression. Home Exercise Program:  HO issued, reviewed and discussed with patient. Pt agreed to comply. Manual Treatments:        Modalities:        Communication with other providers:  POC sent 9/22/22       Assessment:  (Response towards treatment session) (Pain Rating)     Pt is 47year old female with L elbow dislocation mid July 2022. Pt now has difficulties completing lifting with L UE and reaching with L UE with ADLs. Pt demo deficits this date that include L UE pain, difficulties with elbow ext and elbow/shoulder weakness. Pt will benefit with PT services with progression of strength/ROM and modalities to return to PLOF. Pt prior to onset of current condition had min/no pain with able to complete full ADLs and work activities.  Patient received

## 2022-09-22 NOTE — PROGRESS NOTES
Physical Therapy: Initial Evaluation    Patient: Maude Yousif (45 y.o. female)   Examination Date:   Plan of Care Certification Period: 2022 to        :  1970 ;    Confirmed: Yes MRN: [id number]  CSN: [id number]   Insurance: Payor: Crystal [employer] / Plan: BCBS - [place] PPO / Product Type: *No Product type* /   Insurance ID: [id number] - ([place] BCBS) Secondary Insurance (if applicable):    Referring Physician: Joanna Camacho DO     PCP: No primary care provider on file. Visits to Date/Visits Approved:   /      No Show/Cancelled Appts:   /       Medical Diagnosis: Other instability, left elbow [M25.322]    Treatment Diagnosis: L elbow pain, reduced strength     PERTINENT MEDICAL HISTORY   Patient Assessed for Rehabilitation Services: Yes       Medical History: Chart Reviewed: Yes   Past Medical History:   Diagnosis Date    Abnormal Pap smear of cervix     Fibroids     Hyperlipidemia     Hypertension     Irregular menses     Lower back pain     Ovarian cyst      Surgical History:   Past Surgical History:   Procedure Laterality Date    TONSILLECTOMY      WISDOM TOOTH EXTRACTION         Medications: No current outpatient medications on file. Allergies: Patient has no known allergies. SUBJECTIVE EXAMINATION      ,           Subjective History:    Subjective: Mid July with tripped with landing on outstretched with deformity. ED visit with relocation. Sling with week later seeing Dr. Paula Blue with issued elbow brace. Improvement in pain with increased use of ADLs. Denies instability with use but typically uses brace all day at work. Pt is R handed. Very min swollen at distal phalanges. No issues with gripping. Return to full home duties and work duty. Returned one time with next visit tomorrow.   Additional Pertinent Hx (if applicable):     Prior diagnostic testing[de-identified] X-ray      Learning/Language: Learning  Does the patient/guardian have any barriers to learning?: No barriers  Will there be a co-learner?: No  What is the preferred language of the patient/guardian?: English  Is an  required?: No  How does the patient/guardian prefer to learn new concepts?: Listening, Demonstration     Pain Screening   Pain Screening  Patient Currently in Pain: Yes  Pain Assessment: 0-10  Pain Level: 0  Best Pain Level: 0  Worst Pain Level: 2  Patient's Stated Pain Goal: 0 - No pain  Pain Type: Chronic pain  Pain Location: Elbow  Pain Orientation: Left, Posterior  Pain Descriptors: Dull  Pain Frequency: Intermittent  Functional Pain Assessment: Activities are not prevented  Aggravating factors: Lifting, Carrying, Sleeping  Pain Management/Relieving Factors: Rest (NSAIDS)    Functional Status    Dominant Hand: : Right    Social History:  Social History  Lives With: Spouse    Occupation/Interests:  Occupation: Full time employment  Type of Occupation: [employer], [employer]ing items  Leisure & Hobbies: None    Prior Level of Function:    Independent        Current Level of Function:         ADL Assistance: Independent  Homemaking Assistance: Independent  Ambulation Assistance: Independent  Transfer Assistance: Independent  Active : Yes  Mode of Transportation: Car    OBJECTIVE EXAMINATION   Restrictions:             Review of Systems:  Vision: Within Functional Limits  Hearing: Within functional limits  Overall Orientation Status: Within Normal Limits  Follows Commands: Within Functional Limits    VBI Screening / Lumbar Screening:        Regional Screen:    Wrist/Hand Screen: WNL    Observations:       Palpation:   Left Elbow Palpation: no pain with palpation.     Ambulation/Gait (if applicable):       Balance Screen:       Neuro Screen:  WNL  Left AROM  Right AROM         AROM LUE (degrees)  L Elbow Flexion 0-145: WFL  L Elbow Extension 145-0: full to 0 deg with 1/10  L Forearm Pron 0-90: 90  L Forearm Supination  0-90: 90     WNL     Left PROM  Right PROM          WNL     WNL     Left Strength  Right Strength General Strength Testing UE: Right WNL (R: 45#        L: 40# per dynamomter)  Strength LUE  Strength LUE: Exception  Comment: min sensation with discomfort at elbow. L Shoulder Flexion: 4+/5  L Shoulder ABduction: 4+/5  L Shoulder External Rotation: 4+/5  L Elbow Flexion: 5/5  L Elbow Extension: 4+/5  L Forearm Sup: 5/5  L Wrist Flexion: 5/5  L Wrist Extension: 5/5  L Wrist Radial Deviation: 5/5  L Wrist Ulnar Deviation: 5/5 General Strength Testing UE: Right WNL (R: 45#        L: 40# per dynamomter)          Joint Mobility (if applicable):  Normal radial head mobility in all directions without hypermobility noted or pain. Normal varus/valgus without pain. Additional Finding(s) (if applicable): Quick DASH: 23% disability        ASSESSMENT     Impression:  Pt is 47year old female with L elbow dislocation mid July 2022. Pt now has difficulties completing lifting with L UE and reaching with L UE with ADLs. Pt demo deficits this date that include L UE pain, difficulties with elbow ext and elbow/shoulder weakness. Pt will benefit with PT services with progression of strength/ROM and modalities to return to PLOF. Pt prior to onset of current condition had min/no pain with able to complete full ADLs and work activities. Patient received education on their current pathology and how their condition effects them with their functional activities. Patient understood discussion and questions were answered. Patient understands their activity limitations and understands rational for treatment progression. Body Structures, Functions, Activity Limitations Requiring Skilled Therapeutic Intervention: Decreased functional mobility , Decreased ROM, Decreased strength, Decreased endurance    Statement of Medical Necessity: Physical Therapy is both indicated and medically necessary as outlined in the POC to increase the likelihood of meeting the functionally related goals stated below.      Patient's Activity Tolerance: Patient tolerated evaluation without incident      Patient's rehabilitation potential/prognosis is considered to be: Good    Factors which may impact rehabilitation potential include: None  Measures taken to address barrier(s): N/A     GOALS   Patient Goal(s): return to full function. Short Term Goals Completed by Defer to Long Term Goals Goal Status                                                                   Long Term Goals Completed by 4 weeks 10/22/22 Goal Status   Pt will demo I with HEP/symptom management. Pt will demo full elbow AROM without any increase in elbow pain/symptoms to ease ADLs. Pt will demo 5/5 strength and able to comfortably weightbear through shoulder without symptoms to ease lifting. Pt will demo <12% disability per Quick DASH to demo improved function. Pt will demo lifting 5# overhead with L UE x5 to demo improved tolerance with L UE.                                        TREATMENT PLAN       Requires PT Follow-Up: Yes  Specific Instructions for Next Treatment: review HEP, ext ROM to end ranges, elbow strengthening in all dir, weightbear throughout shoulders as tolerated, modalities PRN.     Pt. actively involved in establishing Plan of Care and Goals: Yes  Patient/ Caregiver education and instruction: Goals, PT Role, Plan of Care, Evaluative findings, Home Exercise Program             Treatment may include any combination of the following: ROM, Manual Therapy - Soft Tissue Mobilization, Therapeutic activities, Home exercise program, Modalities, Neuromuscular re-education, Strengthening     Frequency / Duration:  Patient to be seen 1 for 4 weeks      Eval Complexity: Overall Evaluation : Low  Decision Making: Low Complexity  History: Personal Factors and/or Comorbidities Impacting POC: Low  Examination of body system(s) including body structures and functions, activity limitations, and/or participation restrictions: Low  Clinical Presentation: Low           Therapist Signature: Corby Lynn PT , DPT, OCS   Date: 9/22/2022 4/62/7439 3:20 AM   I certify that the above Therapy Services are being furnished while the patient is under my care. I agree with the treatment plan and certify that this therapy is necessary. Physician's Signature:  ___________________________   Date:_______                                                                   Roxanna Cates DO        Physician Comments: _______________________________________________    Please sign and return to [address]. Please fax to the location listed below.  [facility] for this referral!    [address] [facility] [address] [address], [address] [id number]  Dept: [phone]  Loc: [phone]

## 2022-09-22 NOTE — PLAN OF CARE
Outpatient Physical Therapy           [place]           [] Phone: [phone]   Fax: [phone]  [place]           [] Phone: [phone]   Fax: [phone]     To: Karyle Glenn, DO     From: Stefany Nath, PT, DPT, OCS    Patient: Esa Borja       : 1970  Diagnosis: Other instability, left elbow [M25.322]    Treatment Diagnosis: L elbow pain, reduced strength  Date: 2022    Physical Therapy Certification/Re-Certification Form  Dear Dr. Teddy Bansal   The following patient has been evaluated for physical therapy services and for therapy to continue, insurance requires physician review of the treatment plan initially and every 90 days. Please review the attached evaluation and/or summary of the patient's plan of care, and verify that you agree therapy should continue by signing the attached document and sending it back to our office. Assessment:     Pt is 47year old female with L elbow dislocation mid 2022. Pt now has difficulties completing lifting with L UE and reaching with L UE with ADLs. Pt demo deficits this date that include L UE pain, difficulties with elbow ext and elbow/shoulder weakness. Pt will benefit with PT services with progression of strength/ROM and modalities to return to OF. Pt prior to onset of current condition had min/no pain with able to complete full ADLs and work activities. Patient received education on their current pathology and how their condition effects them with their functional activities. Patient understood discussion and questions were answered. Patient understands their activity limitations and understands rational for treatment progression.      Plan of Care/Treatment to date:  [x] Therapeutic Exercise  [x] Modalities:  [x] Therapeutic Activity     [] Ultrasound  [] Electrical Stimulation  [] Gait Training      [] Cervical Traction [] Lumbar Traction  [x] Neuromuscular Re-education    [x] Cold/hotpack [] Iontophoresis   [x] Instruction in

## 2022-09-23 ENCOUNTER — OFFICE VISIT (OUTPATIENT)
Dept: ORTHOPEDIC SURGERY | Age: 52
End: 2022-09-23
Payer: COMMERCIAL

## 2022-09-23 VITALS — OXYGEN SATURATION: 97 % | HEART RATE: 82 BPM | SYSTOLIC BLOOD PRESSURE: 122 MMHG | DIASTOLIC BLOOD PRESSURE: 80 MMHG

## 2022-09-23 DIAGNOSIS — M25.322 INSTABILITY OF LEFT ELBOW JOINT: Primary | ICD-10-CM

## 2022-09-23 PROCEDURE — 99213 OFFICE O/P EST LOW 20 MIN: CPT | Performed by: STUDENT IN AN ORGANIZED HEALTH CARE EDUCATION/TRAINING PROGRAM

## 2022-09-23 ASSESSMENT — ENCOUNTER SYMPTOMS
NAUSEA: 0
EYE REDNESS: 0
PHOTOPHOBIA: 0
COUGH: 0
WHEEZING: 0
VOMITING: 0
SHORTNESS OF BREATH: 0
COLOR CHANGE: 0
FACIAL SWELLING: 0
STRIDOR: 0
BACK PAIN: 0
EYE PAIN: 0
ABDOMINAL PAIN: 0

## 2022-09-23 NOTE — PROGRESS NOTES
Patient is here for follow up on her left elbow. Elbow was originally injured in a fall. Patient states pain improving, no pain today. No numbness or tingling. No new injury since last visit on 8/26/22.  Patient states physical therapy has been helping and has 2 more appointments scheduled with PT.

## 2022-09-23 NOTE — PROGRESS NOTES
9/23/2022   Chief Complaint   Patient presents with    Elbow Injury     Patient is here for follow up on her left elbow, originally injured from a fall     Updated HPI: Patient is here for reevaluation of her left elbow. Patient states she is doing excellent has no complaints at this time. She states he has full range of motion and strength. She has had no episodes of instability or pain. She has been doing physical therapy without complaint and states that she has a few appointments left. She took her self out of the brace yesterday but states she is very compliant with it. No new injuries or complaints and no changes to her health history. Previous HPI (8/26/2022): Patient is here for reevaluation of her left elbow dislocation. She states has been doing well in the brace and has had no issues with sensation of subluxation or dislocation. She states that the elbow feels stable especially in the brace and her pain has been well controlled. She has been following her restrictions while at work and at home without issue. No new injuries or any new orthopedic complaints. Previous HPI (7/29/2022):                             Zeynep Donnelly is a 47 y.o. female right hand-dominant male referred by emergency room for evaluation and treatment of left elbow dislocation. Patient was seen in the [facility] emergency room approximate 1 week ago when she slipped and fell from standing height in her home landing on her outstretched left arm. She suffered a elbow dislocation at that time and had immediate deformity and pain and was unable to move the elbow due to mechanical block. She was brought to the emergency room where x-rays demonstrated a simple left elbow dislocation. She was subsequently sedated and the elbow was reduced without complication. Patient had no complications from the elbow dislocation or reduction procedure. She was placed in a simple sling and told to follow-up with orthopedics. She is here today for first visit with myself. She states she is coming to the sling as her elbow has regained full range of motion. She states she has essentially no pain at this time and no additional symptoms such as numbness or tingling. She states she can fully extend and flex the elbow without issue. She denies any prior elbow injury including dislocation. No additional interventions in regards to the left elbow. Related history: negative for prior surgery, additional trauma, arthritis or disorders. Is affecting ADLs. Pain is 1/10 at it's worst.    Outside reports reviewed: Emergency room note and imaging reviewed    Patient's medications, allergies, past medical, surgical, social and family histories were reviewed and updated as appropriate. Patient has not previously attempted CSI, PT, NSAIDs for pain relief     MA HPI: Patient, 62 yo female, presents to the office today for a followup appointment following an ED visit on 7/21/2022. Two sets of x-rays were taken. Patient reports pain on point of elbow and around triceps. ROM improving. Right hand dominant. No report of numbness or tingling. Medical History  Patient's medications, allergies, past medical, surgical, social and family histories were reviewed and updated as appropriate.     Past Medical History:   Diagnosis Date    Abnormal Pap smear of cervix     Fibroids     Hyperlipidemia     Hypertension     Irregular menses     Lower back pain     Ovarian cyst      Past Surgical History:   Procedure Laterality Date    TONSILLECTOMY      WISDOM TOOTH EXTRACTION       Family History   Problem Relation Age of Onset    Parkinson's Disease Maternal Grandmother     Prostate Cancer Paternal Grandmother      Social History     Socioeconomic History    Marital status:    Tobacco Use    Smoking status: Never    Smokeless tobacco: Never   Vaping Use    Vaping Use: Never used   Substance and Sexual Activity    Alcohol use: Yes     Comment: little    Drug use: Never     Social Determinants of Health     Financial Resource Strain: Low Risk     Difficulty of Paying Living Expenses: Not very hard   Food Insecurity: No Food Insecurity    Worried About Running Out of Food in the Last Year: Never true    Ran Out of Food in the Last Year: Never true   Transportation Needs: No Transportation Needs    Lack of Transportation (Medical): No    Lack of Transportation (Non-Medical): No   Housing Stability: Unknown    Unable to Pay for Housing in the Last Year: No    Unstable Housing in the Last Year: No     No current outpatient medications on file. No current facility-administered medications for this visit. No Known Allergies      Review of Systems   Constitutional:  Negative for activity change, appetite change, chills, diaphoresis, fatigue, fever and unexpected weight change. HENT:  Negative for congestion, ear pain, facial swelling, hearing loss and sneezing. Eyes:  Negative for photophobia, pain and redness. Respiratory:  Negative for cough, shortness of breath, wheezing and stridor. Cardiovascular:  Negative for chest pain, palpitations and leg swelling. Gastrointestinal:  Negative for abdominal pain, nausea and vomiting. Endocrine: Negative for cold intolerance and heat intolerance. Musculoskeletal:  Negative for arthralgias, back pain, gait problem, joint swelling, myalgias, neck pain and neck stiffness. Skin:  Negative for color change, pallor, rash and wound. Neurological:  Negative for dizziness, facial asymmetry, weakness and numbness. Examination:  General Exam:  Vitals: /80 (Site: Right Wrist, Position: Sitting)   Pulse 82   SpO2 97%    Physical Exam  Constitutional:       General: She is not in acute distress. Appearance: Normal appearance. She is normal weight. She is not ill-appearing. HENT:      Head: Normocephalic and atraumatic.    Eyes:      General: Right eye: No discharge. Left eye: No discharge. Extraocular Movements: Extraocular movements intact. Cardiovascular:      Rate and Rhythm: Normal rate. Pulses: Normal pulses. Pulmonary:      Effort: Pulmonary effort is normal.      Breath sounds: Normal breath sounds. Musculoskeletal:         General: No swelling, tenderness, deformity or signs of injury. Right shoulder: Normal.      Left shoulder: Normal.      Right upper arm: Normal.      Left upper arm: Normal.      Right elbow: Normal.      Left elbow: No swelling, deformity, effusion or lacerations. Normal range of motion. No tenderness. Right forearm: Normal.      Left forearm: Normal.      Right wrist: Normal.      Left wrist: Normal.      Cervical back: Normal range of motion. Skin:     General: Skin is warm and dry. Capillary Refill: Capillary refill takes less than 2 seconds. Neurological:      General: No focal deficit present. Mental Status: She is alert and oriented to person, place, and time. Mental status is at baseline. Psychiatric:         Mood and Affect: Mood normal.         Behavior: Behavior normal.         LEFT UPPER EXTREMITY EXAM:      OBSERVATION / INSPECTION:     Swelling: None    Deformity: none    Discoloration: none     Scars: none     Atrophy: none          TENDERNESS / CREPITUS (T / C):       Ulnar Styloid -/-    Radial Styloid -/-    Palm -/-    Metacarpals -/-    Thumb CMC -/-   Thumb MCP -/-    Lesser MCPs -/-    PIP -/-    DIP -/-    Radial Head - / -    Olecranon -/ -    Medial Epicondyle - /-   Lat Epicondyle - / -    Extensor Mass - / -   Flexor Mass - / -    Biceps tendon insertion - / -        Range of motion: ('*' = with pain)     Right Elbow     AROM (PROM)     Extension 0 deg  (5 deg)     Flexion 145 deg (145 deg)        Pronation 90 deg  (90 deg)     Supination 80 deg  (80 deg)                Left Elbow     AROM (PROM)     Extension 5 deg  (0 deg)     Flexion 145 deg (145 deg)        Pronation 90 deg  (90 deg)     Supination 80 deg  (80 deg)          Right Wrist    Extension 80 deg (85 deg)     Flexion 80 deg (85 deg)        Ulnar Deviation  35 deg (40 deg)    Radial Deviation 35 deg (40 deg)             Left Wrist     AROM (PROM)     Extension 80 deg (85 deg)     Flexion 80 deg (85 deg)        Ulnar Deviation  35 deg (40 deg)    Radial Deviation 35 deg (40 deg)         ELBOW EXAMINATION:    Minimal pain with supination pronation    Stable with varus/valgus stress    Moving valgus stress test: Negative    Milking maneuver: Negative    No sensation of instability, subluxation, dislocation or mechanical symptoms with elbow flexion extension including to full extension. STRENGTH: ('*' = with pain)     Elbow flexion extension not tested this time due to concern for instability of elbow    Wrist Flexion: 5/5    Wrist Extension: 5/5    : 5/5    Intrinsics: 5/5    EPL (Extensor pollicis longus): 5/5    Pinch Mechanism: 5/5      EXTREMITY NEURO-VASCULAR EXAMINATION: Sensation grossly intact to light touch all dermatomal regions. DTR 2+ Biceps, Triceps, BR and Negative Lindy's sign. Grossly intact motor function at Elbow, Wrist and Hand. Distal pulses radial and ulnar 2+, brisk cap refill, symmetric. Diagnostic testing:  No new orthopedic imaging    Previous imaging  3 view prereduction of left elbow demonstrate: There is a left elbow dislocation. The distal humerus is dislocated   anteriorly in relation to the olecranon fossa and radial head. No evidence   of an acute fracture. There are a few small smooth ossific densities. Postreduction study recommended. 3 view postreduction of left elbow demonstrate:   Reduction of the previously dislocated left elbow. The alignment is now   acceptable. Bulging fat pads are present from joint effusion/heme arthrosis. No acute fracture is seen around the margins of the left elbow.   There are   corticated ossifications at the medial and lateral aspects. Mild swelling around the elbow. Office Procedures:  No orders of the defined types were placed in this encounter. Assessment and Plan    A: Left elbow instability    P:  I had a thorough discussion with the patient regarding her left elbow physical exam findings and treatment course. I explained that she is essentially asymptomatic at this time and I have no concerns on physical exam for instability. We can slowly begin her to return to all activities. I stated that over the next month she should begin weaning herself out of the brace. Her goal is to be out of the brace fully in a month. She can be out of the brace at all times when at home if she is not doing any lifting pulling or pushing. However, she should continue the brace if doing any type of labors activity over the next month or any situation where she might fall. She should finish out her physical therapy but should also continue her physical therapy exercises that she has been taught. She continue ice and over-the-counter anti-inflammatories for pain control. She is weightbearing as tolerated but I would again avoid any type of high-impact activity for additional month. I would also limit her lifting of the left upper extremity to 15 pounds over the next month and then increase as tolerated. She can follow-up with me as needed. All questions were answered and patient voices understanding.       Electronically signed by Eduardo Pena DO on 9/23/2022 at 8:10 AM

## 2022-10-06 ENCOUNTER — HOSPITAL ENCOUNTER (OUTPATIENT)
Dept: PHYSICAL THERAPY | Age: 52
Setting detail: THERAPIES SERIES
Discharge: HOME | End: 2022-10-06
Payer: COMMERCIAL

## 2022-10-06 PROCEDURE — 97530 THERAPEUTIC ACTIVITIES: CPT

## 2022-10-06 PROCEDURE — 97110 THERAPEUTIC EXERCISES: CPT

## 2022-10-06 NOTE — FLOWSHEET NOTE
Outpatient Physical Therapy  [place]           [x] Phone: [phone]   Fax: [phone]  [place]           [] Phone: [phone]   Fax: [phone]        Physical Therapy Daily Treatment Note  Date:  10/6/2022    Patient Name:  Minal Hathaway    :  1970  MRN: [id number]  Restrictions/Precautions: No data recorded      Diagnosis:   Other instability, left elbow [M25.322]    Date of Injury/Surgery:   Treatment Diagnosis:  L elbow pain, reduced strength  Insurance/Certification information:  [facility]      Referring Physician:  Yobany Gama DO     PCP: No primary care provider on file. Next Doctor Visit:    Plan of care signed (Y/N):  Yes  Outcome Measure: Quick DASH: 23% disability   Visit# / total visits:  2 /4 per POC  Pain level: 0 /10   Goals:     Patient goals: return to full function. Short term goals  Time Frame for Short term goals: Defer to [place] Term Goals  Time Frame for Long Term Goals: 4 weeks 10/22/22  Pt will demo I with HEP/symptom management. Pt will demo full elbow AROM without any increase in elbow pain/symptoms to ease ADLs. Pt will demo 5/5 strength and able to comfortably weightbear through shoulder without symptoms to ease lifting. Pt will demo <12% disability per Quick DASH to demo improved function. Pt will demo lifting 5# overhead with L UE x5 to demo improved tolerance with L UE. Summary of Evaluation:   Pt is 47year old female with L elbow dislocation mid 2022. Pt now has difficulties completing lifting with L UE and reaching with L UE with ADLs. Pt demo deficits this date that include L UE pain, difficulties with elbow ext and elbow/shoulder weakness. Pt will benefit with PT services with progression of strength/ROM and modalities to return to PLOF. Pt prior to onset of current condition had min/no pain with able to complete full ADLs and work activities.  Patient received education on their current pathology and how their condition effects them with their functional activities. Patient understood discussion and questions were answered. Patient understands their activity limitations and understands rational for treatment progression. Subjective: No pain. Doing well with all ADLs. Completing HEP. Feels 90% compared to PLOF. Any changes in Ambulatory Summary Sheet? None        Objective:      COVID screening questions were asked and patient attested that there had been no contact or symptoms    Fulll ext without symptoms against resistance with open and closed chain activities. Exercises: (No more than 4 columns)   Exercise/Equipment 9/22/22  #1 10/6/22  #2 Date           WARM UP       UBE    1.5/1.5'             TABLE      *Taffy pull  L UE GTB 10x2 GTb 10x2                               STANDING      *Resisted scaption  GTB 10x2     *Elbow ext  15\"x4     *UE weightshifting  Arm lifts 10x2 Arm lifts 10x2    Fitter UE lateral 2blue cords 10x2    pulldowns  GTB 10x2    Resisted punches   GTB 10x2    TRX pulls   10x2        Ball behind head/waist    2# 10x2    PROPRIOCEPTION      Ball throw into rebounder with OH reach. 6# 10x2                            MODALITIES                      Other Therapeutic Activities/Education:  --      Home Exercise Program:  HO issued, reviewed and discussed with patient. Pt agreed to comply. Manual Treatments:        Modalities:        Communication with other providers:  POC sent 9/22/22       Assessment:  (Response towards treatment session) (Pain Rating)  Reviewed HEP and demo good technique. No pain with home program and progressed activities with increased tension with arm extended or with weightbearing. No deficit in function. Anticipate discharge at next visit with home program due to high function, low disability or pain. Pt agreed to this plan. No pain post tx. Pt is 47year old female with L elbow dislocation mid July 2022.  Pt now has difficulties completing lifting with L UE and reaching with L UE with ADLs. Pt demo deficits this date that include L UE pain, difficulties with elbow ext and elbow/shoulder weakness. Pt will benefit with PT services with progression of strength/ROM and modalities to return to PLOF. Pt prior to onset of current condition had min/no pain with able to complete full ADLs and work activities. Patient received education on their current pathology and how their condition effects them with their functional activities. Patient understood discussion and questions were answered. Patient understands their activity limitations and understands rational for treatment progression. Plan for Next Session:   Specific Instructions for Next Treatment: review HEP, ext ROM to end ranges, elbow strengthening in all dir, weightbear throughout shoulders as tolerated, modalities PRN.     Time In / Time Out:    3308-1464             Timed Code/Total Treatment Minutes:  01/71'    30' TE,  13' TA       Next Progress Note due:  Francisco Javier 9/22/22  Visit 10       Plan of Care Interventions:  [x] Therapeutic Exercise  [x] Modalities:  [x] Therapeutic Activity     [] Ultrasound  [] Estim  [] Gait Training      [] Cervical Traction [] Lumbar Traction  [x] Neuromuscular Re-education    [x] Cold/hotpack [] Iontophoresis   [x] Instruction in HEP      [x] Vasopneumatic   [] Dry Needling    [x] Manual Therapy               [] Aquatic Therapy              Electronically signed by:  Martin Desai, PT, DPT, OCS  10/6/2022, 6:42 AM    10/6/2022 6:42 AM

## 2022-10-13 ENCOUNTER — HOSPITAL ENCOUNTER (OUTPATIENT)
Dept: PHYSICAL THERAPY | Age: 52
Setting detail: THERAPIES SERIES
Discharge: HOME | End: 2022-10-13
Payer: COMMERCIAL

## 2022-10-13 PROCEDURE — 97530 THERAPEUTIC ACTIVITIES: CPT

## 2022-10-13 PROCEDURE — 97110 THERAPEUTIC EXERCISES: CPT

## 2022-10-13 NOTE — FLOWSHEET NOTE
Outpatient Physical Therapy  [place]           [x] Phone: [phone]   Fax: [phone]  [facility]           [] Phone: [phone]   Fax: [phone]        Physical Therapy Daily Treatment Note  Date:  10/13/2022    Patient Name:  Akash Lopez    :  1970  MRN: [id number]  Restrictions/Precautions: No data recorded      Diagnosis:   Other instability, left elbow [M25.322]    Date of Injury/Surgery:   Treatment Diagnosis:  L elbow pain, reduced strength  Insurance/Certification information:  [facility]      Referring Physician:  Mary Crowder DO     PCP: No primary care provider on file. Next Doctor Visit:    Plan of care signed (Y/N):  Yes  Outcome Measure: Quick DASH: 23% disability   Visit# / total visits:  3 /4 per POC  Pain level: 0 /10   Goals:     Patient goals: return to full function. Short term goals  Time Frame for Short term goals: Defer to [place] Term Goals  Time Frame for Long Term Goals: 4 weeks 10/22/22  Pt will demo I with HEP/symptom management. Pt will demo full elbow AROM without any increase in elbow pain/symptoms to ease ADLs. Pt will demo 5/5 strength and able to comfortably weightbear through shoulder without symptoms to ease lifting. Pt will demo <12% disability per Quick DASH to demo improved function. Pt will demo lifting 5# overhead with L UE x5 to demo improved tolerance with L UE. Summary of Evaluation:   Pt is 47year old female with L elbow dislocation mid 2022. Pt now has difficulties completing lifting with L UE and reaching with L UE with ADLs. Pt demo deficits this date that include L UE pain, difficulties with elbow ext and elbow/shoulder weakness. Pt will benefit with PT services with progression of strength/ROM and modalities to return to PLOF. Pt prior to onset of current condition had min/no pain with able to complete full ADLs and work activities.  Patient received education on their current pathology and how their condition effects them with their functional activities. Patient understood discussion and questions were answered. Patient understands their activity limitations and understands rational for treatment progression. Subjective: She continues to have no issues wth performing ADLs. She arrives with no pain and is compliant with her HEP. Any changes in Ambulatory Summary Sheet? None        Objective:      COVID screening questions were asked and patient attested that there had been no contact or symptoms    Full ext without symptoms against resistance with open and closed chain activities. Exercises: (No more than 4 columns)   Exercise/Equipment 9/22/22  #1 10/6/22  #2 10/13/22 #3           WARM UP       UBE    1.5/1.5'    2' fwd/2'bwk         TABLE      *Taffy pull  L UE GTB 10x2 GTb 10x2                               STANDING      *Resisted scaption  GTB 10x2     *Elbow ext  15\"x4     *UE weightshifting  Arm lifts 10x2 Arm lifts 10x2    Fitter UE lateral 2blue cords 10x2 Arm lifts modified plantigrade 2x10 fwds and bwks   pulldowns  GTB 10x2 GTB 10x2   Resisted punches   GTB 10x2 GTB 10x2   TRX pulls   10x2 10x2    Ball behind head/waist    2# 10x2 6# 10x2   Body blade   Small blade: elbow extended with shoulder flex to 90* lateral and sup/inferior x20\" ea    shoulder at 180* lat movement x20\"   Pushups   Modified plantigrade 2x10          PROPRIOCEPTION      Ball throw into rebounder with OH reach. 6# 10x2 6# 10x2                           MODALITIES                      Other Therapeutic Activities/Education:  --      Home Exercise Program:  HO issued, reviewed and discussed with patient. Pt agreed to comply. Manual Treatments:        Modalities:        Communication with other providers:  POC sent 9/22/22       Assessment:  (Response towards treatment session) (Pain Rating)  Patient has good tolerance with today's activities.  She only required min cueing with introduced activities today. No pain or discomfort is experienced with today's activities. She states she is doing well and will keep up her activites at home and will reschedule if she needs additional services. No pain post tx. Pt is 47year old female with L elbow dislocation mid July 2022. Pt now has difficulties completing lifting with L UE and reaching with L UE with ADLs. Pt demo deficits this date that include L UE pain, difficulties with elbow ext and elbow/shoulder weakness. Pt will benefit with PT services with progression of strength/ROM and modalities to return to PLOF. Pt prior to onset of current condition had min/no pain with able to complete full ADLs and work activities. Patient received education on their current pathology and how their condition effects them with their functional activities. Patient understood discussion and questions were answered. Patient understands their activity limitations and understands rational for treatment progression. Plan for Next Session:   Specific Instructions for Next Treatment: review HEP, ext ROM to end ranges, elbow strengthening in all dir, weightbear throughout shoulders as tolerated, modalities PRN. Time In / Time Out:    7688/7472             Timed Code/Total Treatment Minutes:  38'  15' TE,  23' TA       Next Progress Note due:  Francisco Javier 9/22/22  Visit 10       Plan of Care Interventions:  [x] Therapeutic Exercise  [x] Modalities:  [x] Therapeutic Activity     [] Ultrasound  [] Estim  [] Gait Training      [] Cervical Traction [] Lumbar Traction  [x] Neuromuscular Re-education    [x] Cold/hotpack [] Iontophoresis   [x] Instruction in HEP      [x] Vasopneumatic   [] Dry Needling    [x] Manual Therapy               [] Aquatic Therapy              Electronically signed by:   Garrett Del Cid,     10/13/2022 7:57 AM

## 2022-12-08 NOTE — DISCHARGE SUMMARY
Outpatient Physical Therapy           [place]           [] Phone: [phone]   Fax: [phone]  [facility]           [] Phone: [phone]   Fax: [phone]      To: Nirmala Bergeron DO     From: Jericho Ta, PT, DPT, OCS      Patient: Ella Bonilla                    : 1970  Diagnosis:  Other instability, left elbow [M25.322]        Treatment Diagnosis:     L elbow pain, reduced strength  Date: 2022  []  Progress Note                [x]  Discharge Note    Evaluation Date:  22   Total Visits to date:   3 Cancels/No-shows to date:  0    Subjective:  Per note on 10/13/22  She continues to have no issues wth performing ADLs. She arrives with no pain and is compliant with her HEP. Plan of Care/Treatment to date:  [x] Therapeutic Exercise    [] Modalities:  [x] Therapeutic Activity     [] Ultrasound  [] Electrical Stimulation  [] Gait Training      [] Cervical Traction   [] Lumbar Traction  [x] Neuromuscular Re-education  [] Cold/hotpack [] Iontophoresis  [x] Instruction in HEP      Other:  [x] Manual Therapy       []  Vasopneumatic  [] Aquatic Therapy       []   Dry Needle Therapy                      Objective/Significant Findings At Last Visit/Comments: Per note on 10/13/22   Full ext without symptoms against resistance with open and closed chain activities. Assessment:   Pt completed total of 3 visits since start of care on 22. At last visit with no pain and good technique with home program. Will discharge at this time with no follow up in >30 days since last no visit with last follow up on 10/13/22. Goal Status:  [] Achieved [] Partially Achieved  [x] Not Achieved, no return to further assess. Patient goals: return to full function. Short term goals  Time Frame for Short term goals: Defer to [facility] 80 Term Goals  Time Frame for Long Term Goals: 4 weeks 10/22/22  Pt will demo I with HEP/symptom management.   Pt will demo full elbow AROM without any increase in elbow pain/symptoms to ease ADLs. MET   Pt will demo 5/5 strength and able to comfortably weightbear through shoulder without symptoms to ease lifting. Pt will demo <12% disability per Quick DASH to demo improved function. Pt will demo lifting 5# overhead with L UE x5 to demo improved tolerance with L UE. Patient Status: [] Continue per initial plan of Care     [x] Patient now discharged     [] Additional visits requested, Please re-certify for additional visits: If we are requesting more visits, we fully anticipate the patient's condition is expected to improve within the treatment timeframe we are requesting. Electronically signed by:  Daniel Sheffield, PT, DPT, OCS  12/8/2022, 9:36 AM    12/8/2022 9:36 AM   If you have any questions or concerns, please don't hesitate to call.   Thank you for your referral.    Physician Signature:______________________ Date:______ Time: ________  By signing above, therapists plan is approved by physician